# Patient Record
Sex: FEMALE | Race: OTHER | Employment: FULL TIME
[De-identification: names, ages, dates, MRNs, and addresses within clinical notes are randomized per-mention and may not be internally consistent; named-entity substitution may affect disease eponyms.]

---

## 2024-04-24 ENCOUNTER — HOSPITAL ENCOUNTER (OUTPATIENT)
Facility: HOSPITAL | Age: 54
Setting detail: SPECIMEN
Discharge: HOME | End: 2024-04-27
Payer: COMMERCIAL

## 2024-04-24 ENCOUNTER — OFFICE VISIT (OUTPATIENT)
Facility: CLINIC | Age: 54
End: 2024-04-24
Payer: COMMERCIAL

## 2024-04-24 VITALS
TEMPERATURE: 98.4 F | SYSTOLIC BLOOD PRESSURE: 119 MMHG | OXYGEN SATURATION: 97 % | RESPIRATION RATE: 16 BRPM | WEIGHT: 144 LBS | BODY MASS INDEX: 26.5 KG/M2 | HEART RATE: 67 BPM | HEIGHT: 62 IN | DIASTOLIC BLOOD PRESSURE: 82 MMHG

## 2024-04-24 DIAGNOSIS — Z11.4 SCREENING FOR HIV (HUMAN IMMUNODEFICIENCY VIRUS): ICD-10-CM

## 2024-04-24 DIAGNOSIS — Z00.00 WELL WOMAN EXAM (NO GYNECOLOGICAL EXAM): Primary | ICD-10-CM

## 2024-04-24 DIAGNOSIS — K59.04 CHRONIC IDIOPATHIC CONSTIPATION: ICD-10-CM

## 2024-04-24 DIAGNOSIS — Z11.59 NEED FOR HEPATITIS C SCREENING TEST: ICD-10-CM

## 2024-04-24 DIAGNOSIS — Z00.00 WELL WOMAN EXAM (NO GYNECOLOGICAL EXAM): ICD-10-CM

## 2024-04-24 DIAGNOSIS — Z76.89 ENCOUNTER TO ESTABLISH CARE: ICD-10-CM

## 2024-04-24 DIAGNOSIS — R35.0 FREQUENCY OF URINATION: ICD-10-CM

## 2024-04-24 DIAGNOSIS — Z13.220 LIPID SCREENING: ICD-10-CM

## 2024-04-24 DIAGNOSIS — Z13.1 SCREENING FOR DIABETES MELLITUS (DM): ICD-10-CM

## 2024-04-24 DIAGNOSIS — Z12.31 ENCOUNTER FOR SCREENING MAMMOGRAM FOR MALIGNANT NEOPLASM OF BREAST: ICD-10-CM

## 2024-04-24 DIAGNOSIS — N30.00 ACUTE CYSTITIS WITHOUT HEMATURIA: ICD-10-CM

## 2024-04-24 LAB
ALBUMIN SERPL-MCNC: 4.3 G/DL (ref 3.4–5)
ALBUMIN/GLOB SERPL: 1.5 (ref 0.8–1.7)
ALP SERPL-CCNC: 106 U/L (ref 45–117)
ALT SERPL-CCNC: 29 U/L (ref 13–56)
ANION GAP SERPL CALC-SCNC: 6 MMOL/L (ref 3–18)
AST SERPL-CCNC: 30 U/L (ref 10–38)
BILIRUB SERPL-MCNC: 0.6 MG/DL (ref 0.2–1)
BILIRUBIN, URINE, POC: NEGATIVE
BLOOD URINE, POC: NEGATIVE
BUN SERPL-MCNC: 11 MG/DL (ref 7–18)
BUN/CREAT SERPL: 23 (ref 12–20)
CALCIUM SERPL-MCNC: 9.5 MG/DL (ref 8.5–10.1)
CHLORIDE SERPL-SCNC: 109 MMOL/L (ref 100–111)
CHOLEST SERPL-MCNC: 189 MG/DL
CO2 SERPL-SCNC: 25 MMOL/L (ref 21–32)
CREAT SERPL-MCNC: 0.47 MG/DL (ref 0.6–1.3)
ERYTHROCYTE [DISTWIDTH] IN BLOOD BY AUTOMATED COUNT: 12.5 % (ref 11.6–14.5)
EST. AVERAGE GLUCOSE BLD GHB EST-MCNC: 114 MG/DL
GLOBULIN SER CALC-MCNC: 2.9 G/DL (ref 2–4)
GLUCOSE SERPL-MCNC: 91 MG/DL (ref 74–99)
GLUCOSE URINE, POC: NEGATIVE
HBA1C MFR BLD: 5.6 % (ref 4.2–5.6)
HCT VFR BLD AUTO: 44.8 % (ref 35–45)
HDLC SERPL-MCNC: 74 MG/DL (ref 40–60)
HDLC SERPL: 2.6 (ref 0–5)
HGB BLD-MCNC: 14.5 G/DL (ref 12–16)
KETONES, URINE, POC: NEGATIVE
LDLC SERPL CALC-MCNC: 100.4 MG/DL (ref 0–100)
LEUKOCYTE ESTERASE, URINE, POC: ABNORMAL
LIPID PANEL: ABNORMAL
MCH RBC QN AUTO: 28.7 PG (ref 24–34)
MCHC RBC AUTO-ENTMCNC: 32.4 G/DL (ref 31–37)
MCV RBC AUTO: 88.5 FL (ref 78–100)
NITRITE, URINE, POC: NEGATIVE
NRBC # BLD: 0 K/UL (ref 0–0.01)
NRBC BLD-RTO: 0 PER 100 WBC
PH, URINE, POC: 8.5 (ref 4.6–8)
PLATELET # BLD AUTO: 270 K/UL (ref 135–420)
PMV BLD AUTO: 10.6 FL (ref 9.2–11.8)
POTASSIUM SERPL-SCNC: 3.8 MMOL/L (ref 3.5–5.5)
PROT SERPL-MCNC: 7.2 G/DL (ref 6.4–8.2)
PROTEIN,URINE, POC: NEGATIVE
RBC # BLD AUTO: 5.06 M/UL (ref 4.2–5.3)
SODIUM SERPL-SCNC: 140 MMOL/L (ref 136–145)
SPECIFIC GRAVITY, URINE, POC: 1.02 (ref 1–1.03)
TRIGL SERPL-MCNC: 73 MG/DL
TSH SERPL DL<=0.05 MIU/L-ACNC: 1.23 UIU/ML (ref 0.36–3.74)
URINALYSIS CLARITY, POC: ABNORMAL
URINALYSIS COLOR, POC: YELLOW
UROBILINOGEN, POC: ABNORMAL
VLDLC SERPL CALC-MCNC: 14.6 MG/DL
WBC # BLD AUTO: 4.1 K/UL (ref 4.6–13.2)

## 2024-04-24 PROCEDURE — 80053 COMPREHEN METABOLIC PANEL: CPT

## 2024-04-24 PROCEDURE — 87086 URINE CULTURE/COLONY COUNT: CPT

## 2024-04-24 PROCEDURE — 84443 ASSAY THYROID STIM HORMONE: CPT

## 2024-04-24 PROCEDURE — 81003 URINALYSIS AUTO W/O SCOPE: CPT | Performed by: NURSE PRACTITIONER

## 2024-04-24 PROCEDURE — 85027 COMPLETE CBC AUTOMATED: CPT

## 2024-04-24 PROCEDURE — 36415 COLL VENOUS BLD VENIPUNCTURE: CPT

## 2024-04-24 PROCEDURE — 86803 HEPATITIS C AB TEST: CPT

## 2024-04-24 PROCEDURE — 80061 LIPID PANEL: CPT

## 2024-04-24 PROCEDURE — 87389 HIV-1 AG W/HIV-1&-2 AB AG IA: CPT

## 2024-04-24 PROCEDURE — 99386 PREV VISIT NEW AGE 40-64: CPT | Performed by: NURSE PRACTITIONER

## 2024-04-24 PROCEDURE — 83036 HEMOGLOBIN GLYCOSYLATED A1C: CPT

## 2024-04-24 RX ORDER — NITROFURANTOIN 25; 75 MG/1; MG/1
100 CAPSULE ORAL 2 TIMES DAILY
Qty: 10 CAPSULE | Refills: 0 | Status: SHIPPED | OUTPATIENT
Start: 2024-04-24 | End: 2024-04-29

## 2024-04-24 SDOH — ECONOMIC STABILITY: INCOME INSECURITY: HOW HARD IS IT FOR YOU TO PAY FOR THE VERY BASICS LIKE FOOD, HOUSING, MEDICAL CARE, AND HEATING?: NOT VERY HARD

## 2024-04-24 SDOH — ECONOMIC STABILITY: FOOD INSECURITY: WITHIN THE PAST 12 MONTHS, YOU WORRIED THAT YOUR FOOD WOULD RUN OUT BEFORE YOU GOT MONEY TO BUY MORE.: NEVER TRUE

## 2024-04-24 SDOH — ECONOMIC STABILITY: HOUSING INSECURITY
IN THE LAST 12 MONTHS, WAS THERE A TIME WHEN YOU DID NOT HAVE A STEADY PLACE TO SLEEP OR SLEPT IN A SHELTER (INCLUDING NOW)?: YES

## 2024-04-24 SDOH — ECONOMIC STABILITY: FOOD INSECURITY: WITHIN THE PAST 12 MONTHS, THE FOOD YOU BOUGHT JUST DIDN'T LAST AND YOU DIDN'T HAVE MONEY TO GET MORE.: NEVER TRUE

## 2024-04-24 ASSESSMENT — ENCOUNTER SYMPTOMS
RESPIRATORY NEGATIVE: 1
DIARRHEA: 1
CONSTIPATION: 1

## 2024-04-24 ASSESSMENT — PATIENT HEALTH QUESTIONNAIRE - PHQ9
SUM OF ALL RESPONSES TO PHQ QUESTIONS 1-9: 1
1. LITTLE INTEREST OR PLEASURE IN DOING THINGS: NOT AT ALL
SUM OF ALL RESPONSES TO PHQ9 QUESTIONS 1 & 2: 1
2. FEELING DOWN, DEPRESSED OR HOPELESS: SEVERAL DAYS

## 2024-04-24 NOTE — PROGRESS NOTES
[facility]on #[id number]      \"Have you been to the ER, urgent care clinic since your last visit?  Hospitalized since your last visit?\"    NO    “Have you seen or consulted any other health care providers outside of [facility] since your last visit?”    NO    Have you had a mammogram?”   NO    Date of last Mammogram: 4/25/2014      “Have you had a pap smear?”    NO    No cervical cancer screening on file         “Have you had a colorectal cancer screening such as a colonoscopy/FIT/Cologuard?    YES -  8 years ago    No colonoscopy on file  No cologuard on file  No FIT/FOBT on file   No flexible sigmoidoscopy on file         Click Here for Release of Records Request

## 2024-04-24 NOTE — PROGRESS NOTES
[address] [place] [id number]               [phone]      Juliana Khan is a 54 y.o. female and presents with Establish Care, Eye Pain, Fatigue, and Urinary Frequency       Assessment/Plan:    1. Well woman exam (no gynecological exam)  -     Comprehensive Metabolic Panel; Future  -     CBC; Future  2. Frequency of urination  -     AMB POC URINALYSIS DIP STICK AUTO W/O MICRO  3. Encounter to establish care  4. Acute cystitis without hematuria  -     nitrofurantoin, macrocrystal-monohydrate, (MACROBID) 100 MG capsule; Take 1 capsule by mouth 2 times daily for 5 days, Disp-10 capsule, R-0Normal  -     Culture, Urine; Future  5. Encounter for screening mammogram for malignant neoplasm of breast  -     LILY DIGITAL SCREEN W OR WO CAD BILATERAL; Future  6. Chronic idiopathic constipation  -     TSH; Future  7. Screening for diabetes mellitus (DM)  -     Hemoglobin A1C; Future  8. Lipid screening  -     Lipid Panel; Future  9. Need for hepatitis C screening test  -     Hepatitis C Ab, Rflx to Qt by PCR; Future  10. Screening for HIV (human immunodeficiency virus)  -     HIV 1/2 Ag/Ab, 4TH Generation,W Rflx Confirm; Future     Visit today to establish care, last PCP was with urgent cares  Annual physical completed  C/o urinary frequency and feeling of not completely emptying her bladder, POC UA showed 3+ leuk est  Will send urine for C&S and treat with ABX  Health maintenance addressed  C/o chronic constipation, check TFT  Labs today  Patient verbalized understanding and is in agreement with this plan of care  The discharge instructions were also reviewed with DIL    Follow up and disposition:   Return in about 4 weeks (around 5/22/2024) for foot pain, review labs, 30min, office .      Subjective:    Labs obtained prior to visit? No  Reviewed with patient? N/A    Visit today to establish care, prior PCP was urgent cares  : [facility]avon #[id number]  Accompanied today by DIL: Brittany

## 2024-04-25 LAB
BACTERIA SPEC CULT: NORMAL
CC UR VC: NORMAL
HCV AB SERPL QL IA: NORMAL
HCV IGG SERPL QL IA: NON REACTIVE S/CO RATIO
HIV 1+2 AB+HIV1 P24 AG SERPL QL IA: NONREACTIVE
HIV 1/2 RESULT COMMENT: NORMAL
SERVICE CMNT-IMP: NORMAL

## 2024-05-20 ENCOUNTER — PATIENT MESSAGE (OUTPATIENT)
Facility: CLINIC | Age: 54
End: 2024-05-20

## 2024-05-20 NOTE — TELEPHONE ENCOUNTER
From: Juliana Khan  To: Shu Castillo  Sent: 5/20/2024 3:12 PM EDT  Subject: Test results     Good afternoon,    I was wondering if you could explain what the test results indicated. I could not understand most of it.

## 2024-08-07 ENCOUNTER — OFFICE VISIT (OUTPATIENT)
Facility: CLINIC | Age: 54
End: 2024-08-07
Payer: COMMERCIAL

## 2024-08-07 VITALS
TEMPERATURE: 98.4 F | OXYGEN SATURATION: 96 % | RESPIRATION RATE: 18 BRPM | SYSTOLIC BLOOD PRESSURE: 100 MMHG | HEIGHT: 62 IN | HEART RATE: 73 BPM | BODY MASS INDEX: 24.84 KG/M2 | WEIGHT: 135 LBS | DIASTOLIC BLOOD PRESSURE: 66 MMHG

## 2024-08-07 DIAGNOSIS — M79.672 PAIN IN BOTH FEET: Primary | ICD-10-CM

## 2024-08-07 DIAGNOSIS — M79.671 PAIN IN BOTH FEET: Primary | ICD-10-CM

## 2024-08-07 PROCEDURE — 99213 OFFICE O/P EST LOW 20 MIN: CPT | Performed by: NURSE PRACTITIONER

## 2024-08-07 ASSESSMENT — PATIENT HEALTH QUESTIONNAIRE - PHQ9
3. TROUBLE FALLING OR STAYING ASLEEP: MORE THAN HALF THE DAYS
SUM OF ALL RESPONSES TO PHQ9 QUESTIONS 1 & 2: 5
2. FEELING DOWN, DEPRESSED OR HOPELESS: NEARLY EVERY DAY
5. POOR APPETITE OR OVEREATING: NOT AT ALL
9. THOUGHTS THAT YOU WOULD BE BETTER OFF DEAD, OR OF HURTING YOURSELF: NOT AT ALL
8. MOVING OR SPEAKING SO SLOWLY THAT OTHER PEOPLE COULD HAVE NOTICED. OR THE OPPOSITE, BEING SO FIGETY OR RESTLESS THAT YOU HAVE BEEN MOVING AROUND A LOT MORE THAN USUAL: NOT AT ALL
1. LITTLE INTEREST OR PLEASURE IN DOING THINGS: MORE THAN HALF THE DAYS
SUM OF ALL RESPONSES TO PHQ QUESTIONS 1-9: 13
7. TROUBLE CONCENTRATING ON THINGS, SUCH AS READING THE NEWSPAPER OR WATCHING TELEVISION: NEARLY EVERY DAY
SUM OF ALL RESPONSES TO PHQ QUESTIONS 1-9: 13
10. IF YOU CHECKED OFF ANY PROBLEMS, HOW DIFFICULT HAVE THESE PROBLEMS MADE IT FOR YOU TO DO YOUR WORK, TAKE CARE OF THINGS AT HOME, OR GET ALONG WITH OTHER PEOPLE: NOT DIFFICULT AT ALL
4. FEELING TIRED OR HAVING LITTLE ENERGY: NEARLY EVERY DAY
6. FEELING BAD ABOUT YOURSELF - OR THAT YOU ARE A FAILURE OR HAVE LET YOURSELF OR YOUR FAMILY DOWN: NOT AT ALL

## 2024-08-07 NOTE — PROGRESS NOTES
Room 8     Juliana Khan had concerns including Foot Pain. for today's visit .     When asked if patient has any concerns she would like to address with YASH Castillo patient states my head and eyes are hurting a lot and I am still having foot pain . YASH Castillo has been notified  of patient concerns .      1. \"Have you been to the ER, urgent care clinic since your last visit?  Hospitalized since your last visit?\" .NO    2. \"Have you seen or consulted any other health care providers outside of the [facility] since your last visit?\" No     3. For patients aged 45-75: Has the patient had a colonoscopy / FIT/ Cologuard? Order has been placed      If the patient is female:    4. For patients aged 40-74: Has the patient had a mammogram within the past 2 years? Order has been placed       5. For patients aged 21-65: Has the patient had a pap smear? {Cancer Care Gap present?  Patient states I jill jeffersondule a pap with pcp         8/7/2024     3:01 PM   PHQ-9    Little interest or pleasure in doing things 2   Feeling down, depressed, or hopeless 3   Trouble falling or staying asleep, or sleeping too much 2   Feeling tired or having little energy 3   Poor appetite or overeating 0   Feeling bad about yourself - or that you are a failure or have let yourself or your family down 0   Trouble concentrating on things, such as reading the newspaper or watching television 3   Moving or speaking so slowly that other people could have noticed. Or the opposite - being so fidgety or restless that you have been moving around a lot more than usual 0   Thoughts that you would be better off dead, or of hurting yourself in some way 0   PHQ-2 Score 5   PHQ-9 Total Score 13   If you checked off any problems, how difficult have these problems made it for you to do your work, take care of things at home, or get along with other people? 0          Health Maintenance Due   Topic Date Due    Hepatitis B vaccine (1 of 3 - 3-dose series)

## 2024-08-07 NOTE — PROGRESS NOTES
[address] [place] [address]               [phone]      Juliana Khan is a 54 y.o. female and presents with Foot Pain       Assessment/Plan:    1. Pain in both feet  -     [facility] - SLY Ferrell MD, Orthopedic Surgery(General/Foot & Ankle), [place] ([facility])   C/o pain in her feet that has been ongoing for about 3 years  Right is worse than left and the pain is concentrated in the heels  Filiament test negative, pedal pulses 2+ bilateral  Refer to foot specialist for further evaluation      Follow up and disposition:   Return in about 8 months (around 4/7/2025) for Physical, 30min, office.      Subjective:    Labs obtained prior to visit? No  Reviewed with patient? N/A    : Raffaele [id number]    Foot pain  Both feet, right more than left  Burning to feet at night  Goes away with walking but when she sits down the burning starts again  Feels both a burning and pain  Onset: about 3 years ago  Had this evaluated in [place] a month ago, she was told this was gout  The problem is all over but mostly in the heel  Treatments: only the pills the doctor gave her in [place]        ROS:     Review of Systems      The problem list was updated as a part of today's visit.  There is no problem list on file for this patient.      The PSH, FH were reviewed.      SH:  Social History     Tobacco Use    Smoking status: Never     Passive exposure: Never    Smokeless tobacco: Never   Substance Use Topics    Alcohol use: Not Currently    Drug use: Never       Medications/Allergies:  No current outpatient medications on file prior to visit.     No current facility-administered medications on file prior to visit.        No Known Allergies    Objective:  There were no vitals taken for this visit. There is no height or weight on file to calculate BMI.    Physical assessment  Physical Exam            I have discussed the diagnosis with the patient and the intended plan as seen in

## 2024-08-19 ENCOUNTER — OFFICE VISIT (OUTPATIENT)
Age: 54
End: 2024-08-19
Payer: COMMERCIAL

## 2024-08-19 VITALS — HEIGHT: 62 IN | BODY MASS INDEX: 24.69 KG/M2

## 2024-08-19 DIAGNOSIS — M72.2 PLANTAR FASCIITIS, RIGHT: Primary | ICD-10-CM

## 2024-08-19 DIAGNOSIS — M79.671 RIGHT FOOT PAIN: ICD-10-CM

## 2024-08-19 DIAGNOSIS — M72.2 PLANTAR FASCIA SYNDROME: ICD-10-CM

## 2024-08-19 PROCEDURE — 73630 X-RAY EXAM OF FOOT: CPT | Performed by: ORTHOPAEDIC SURGERY

## 2024-08-19 PROCEDURE — 99203 OFFICE O/P NEW LOW 30 MIN: CPT | Performed by: ORTHOPAEDIC SURGERY

## 2024-08-19 RX ORDER — FAMOTIDINE 20 MG/1
20 TABLET, FILM COATED ORAL 2 TIMES DAILY
Qty: 60 TABLET | Refills: 3 | Status: SHIPPED | OUTPATIENT
Start: 2024-08-19

## 2024-08-19 RX ORDER — MELOXICAM 15 MG/1
15 TABLET ORAL DAILY
Qty: 30 TABLET | Refills: 3 | Status: SHIPPED | OUTPATIENT
Start: 2024-08-19

## 2024-08-19 NOTE — PROGRESS NOTES
NL/NL  Vascular: NL foot/ankle pulses,   Lymphatics: No extremity lymphedema, No calf swelling, no tenderness to calf muscles.      LEFT ANKLE/FOOT    Tenderness: moderate to the central calcaneous and plantar fascia.  Cutaneous: WNL.  Joint Motion: WNL   Joint / Tendon Stability:  No Ankle or Subtalar instability or joint laxity.                       No peroneal sublux ability or dislocation  Alignment: neutral Hindfoot,    Neuro Motor/Sensory: NL/NL  Vascular: NL foot/ankle pulses,   Lymphatics: No extremity lymphedema, No calf swelling, no tenderness to calf muscles.    RADIOGRAPHS// IMAGING//DIAGNOSTIC DATA     Orders Placed This Encounter   Procedures    [70639] Foot Min 3V     right     Order Specific Question:   Are you Pregnant?     Answer:   No    Dorsal Night Splint            BILATERAL FOOT X-rays, NWB 3 views, AP/LAT/OBL completed 8/19/2024 AT [facility]    X-rays reveal Osseous:  small inferior calcaneal bone spur is present   no acute fracture//there are no dislocation or subluxation noted. Overall alignment is   acceptable. Soft tissue swelling is   noted. No osteolytic or osteoblastic lesions noted. Mineralization suggests no osteopenia. Degenerative changes are not noted. Calcified vessels are not present.     I have personally reviewed the images of the above study. The interpretation of this study is my professional opinion         CHART REVIEW     Juliana Khan has been experiencing pain and discomfort confirmed as outlined in the pain assessment outlined below.     was reviewed by Chun Ferrell MD on 8/19/2024.     PAST MEDICAL HISTORY: History reviewed. No pertinent past medical history.  PAST SURGICAL HISTORY: No past surgical history on file.  ALLERGIES: No Known Allergies   FAMILY HISTORY: No family history on file.  SOCIAL HISTORY:   Social History     Socioeconomic History    Marital status:      Spouse name: None    Number of children: None    Years of

## 2024-08-19 NOTE — PATIENT INSTRUCTIONS
Patient has plantar fasciitis. Recommended shoes include [employer] Balance Cross Trainer shoes and Hoka shoes.     Visco heel inserts

## 2024-11-05 ENCOUNTER — OFFICE VISIT (OUTPATIENT)
Facility: CLINIC | Age: 54
End: 2024-11-05
Payer: COMMERCIAL

## 2024-11-05 VITALS
HEIGHT: 62 IN | SYSTOLIC BLOOD PRESSURE: 112 MMHG | DIASTOLIC BLOOD PRESSURE: 80 MMHG | RESPIRATION RATE: 18 BRPM | HEART RATE: 77 BPM | BODY MASS INDEX: 25.58 KG/M2 | WEIGHT: 139 LBS | TEMPERATURE: 98.2 F | OXYGEN SATURATION: 97 %

## 2024-11-05 DIAGNOSIS — R19.5 ABNORMAL STOOL COLOR: Primary | ICD-10-CM

## 2024-11-05 DIAGNOSIS — K59.09 OTHER CONSTIPATION: ICD-10-CM

## 2024-11-05 DIAGNOSIS — K59.1 FUNCTIONAL DIARRHEA: ICD-10-CM

## 2024-11-05 PROCEDURE — 99213 OFFICE O/P EST LOW 20 MIN: CPT | Performed by: NURSE PRACTITIONER

## 2024-11-05 ASSESSMENT — COLUMBIA-SUICIDE SEVERITY RATING SCALE - C-SSRS
6. HAVE YOU EVER DONE ANYTHING, STARTED TO DO ANYTHING, OR PREPARED TO DO ANYTHING TO END YOUR LIFE?: NO
2. HAVE YOU ACTUALLY HAD ANY THOUGHTS OF KILLING YOURSELF?: NO
1. WITHIN THE PAST MONTH, HAVE YOU WISHED YOU WERE DEAD OR WISHED YOU COULD GO TO SLEEP AND NOT WAKE UP?: NO

## 2024-11-05 ASSESSMENT — PATIENT HEALTH QUESTIONNAIRE - PHQ9
SUM OF ALL RESPONSES TO PHQ9 QUESTIONS 1 & 2: 6
2. FEELING DOWN, DEPRESSED OR HOPELESS: NEARLY EVERY DAY
10. IF YOU CHECKED OFF ANY PROBLEMS, HOW DIFFICULT HAVE THESE PROBLEMS MADE IT FOR YOU TO DO YOUR WORK, TAKE CARE OF THINGS AT HOME, OR GET ALONG WITH OTHER PEOPLE: EXTREMELY DIFFICULT
5. POOR APPETITE OR OVEREATING: NOT AT ALL
4. FEELING TIRED OR HAVING LITTLE ENERGY: NEARLY EVERY DAY
8. MOVING OR SPEAKING SO SLOWLY THAT OTHER PEOPLE COULD HAVE NOTICED. OR THE OPPOSITE, BEING SO FIGETY OR RESTLESS THAT YOU HAVE BEEN MOVING AROUND A LOT MORE THAN USUAL: MORE THAN HALF THE DAYS
6. FEELING BAD ABOUT YOURSELF - OR THAT YOU ARE A FAILURE OR HAVE LET YOURSELF OR YOUR FAMILY DOWN: NEARLY EVERY DAY
1. LITTLE INTEREST OR PLEASURE IN DOING THINGS: NEARLY EVERY DAY
SUM OF ALL RESPONSES TO PHQ QUESTIONS 1-9: 23
3. TROUBLE FALLING OR STAYING ASLEEP: NEARLY EVERY DAY
SUM OF ALL RESPONSES TO PHQ QUESTIONS 1-9: 23
7. TROUBLE CONCENTRATING ON THINGS, SUCH AS READING THE NEWSPAPER OR WATCHING TELEVISION: NEARLY EVERY DAY
SUM OF ALL RESPONSES TO PHQ QUESTIONS 1-9: 23
SUM OF ALL RESPONSES TO PHQ QUESTIONS 1-9: 20
9. THOUGHTS THAT YOU WOULD BE BETTER OFF DEAD, OR OF HURTING YOURSELF: NEARLY EVERY DAY

## 2024-11-05 NOTE — PROGRESS NOTES
Room 7      Juliana Khan had concerns including Follow-up (Gallbladder ). for today's visit .     When asked if patient has any concerns she  would like to address with YASH Castillo patient states I went to [place] last year and I was told that my gallbladder is big ands it needs to be removed . YASH Castillo has been notified  of patient concerns .      1. \"Have you been to the ER, urgent care clinic since your last visit?  Hospitalized since your last visit?\" .NO    2. \"Have you seen or consulted any other health care providers outside of the [facility] since your last visit?\" NO     3. For patients aged 45-75: Has the patient had a colonoscopy / FIT/ Cologuard? N/A      If the patient is female:    4. For patients aged 40-74: Has the patient had a mammogram within the past 2 years? N/A             11/5/2024    11:31 AM   PHQ-9    Little interest or pleasure in doing things 3   Feeling down, depressed, or hopeless 3   Trouble falling or staying asleep, or sleeping too much 3   Feeling tired or having little energy 3   Poor appetite or overeating 0   Feeling bad about yourself - or that you are a failure or have let yourself or your family down 3   Trouble concentrating on things, such as reading the newspaper or watching television 3   Moving or speaking so slowly that other people could have noticed. Or the opposite - being so fidgety or restless that you have been moving around a lot more than usual 2   Thoughts that you would be better off dead, or of hurting yourself in some way 3   PHQ-2 Score 6   PHQ-9 Total Score 23   If you checked off any problems, how difficult have these problems made it for you to do your work, take care of things at home, or get along with other people? 3          Health Maintenance Due   Topic Date Due    Hepatitis B vaccine (1 of 3 - 19+ 3-dose series) Never done    DTaP/Tdap/Td vaccine (1 - Tdap) Never done    Cervical cancer screen  Never done    Colorectal Cancer 
tablet Take 1 tablet by mouth daily 30 tablet 3     No current facility-administered medications on file prior to visit.        No Known Allergies    Objective:  /80   Pulse 77   Temp 98.2 °F (36.8 °C) (Temporal)   Resp 18   Ht 1.575 m (5' 2\")   Wt 63 kg (139 lb)   SpO2 97%   BMI 25.42 kg/m²  Body mass index is 25.42 kg/m².    Physical assessment  Physical Exam  Abdominal:      General: Bowel sounds are normal.      Palpations: Abdomen is soft.      Tenderness: There is no abdominal tenderness.                 I have discussed the diagnosis with the patient and the intended plan as seen in the above orders.  The patient has received an After-Visit Summary and questions were answered concerning future plans.     An After Visit Summary was printed and given to the patient.    All diagnosis have been discussed with the patient and all of the patient's questions have been answered.           Shu Castillo, AGNP-[facility]  [address] [address]